# Patient Record
Sex: MALE
[De-identification: names, ages, dates, MRNs, and addresses within clinical notes are randomized per-mention and may not be internally consistent; named-entity substitution may affect disease eponyms.]

---

## 2021-12-17 ENCOUNTER — TRANSCRIPTION ENCOUNTER (OUTPATIENT)
Age: 61
End: 2021-12-17

## 2021-12-21 ENCOUNTER — TRANSCRIPTION ENCOUNTER (OUTPATIENT)
Age: 61
End: 2021-12-21

## 2022-06-24 ENCOUNTER — APPOINTMENT (OUTPATIENT)
Dept: ORTHOPEDIC SURGERY | Facility: CLINIC | Age: 62
End: 2022-06-24

## 2022-12-22 ENCOUNTER — APPOINTMENT (OUTPATIENT)
Dept: ORTHOPEDIC SURGERY | Facility: CLINIC | Age: 62
End: 2022-12-22

## 2022-12-22 DIAGNOSIS — Z78.9 OTHER SPECIFIED HEALTH STATUS: ICD-10-CM

## 2022-12-22 DIAGNOSIS — Z87.442 PERSONAL HISTORY OF URINARY CALCULI: ICD-10-CM

## 2022-12-22 PROCEDURE — 72040 X-RAY EXAM NECK SPINE 2-3 VW: CPT

## 2022-12-22 PROCEDURE — 99214 OFFICE O/P EST MOD 30 MIN: CPT

## 2022-12-22 RX ORDER — OMEPRAZOLE 20 MG/1
TABLET, DELAYED RELEASE ORAL
Refills: 0 | Status: ACTIVE | COMMUNITY

## 2022-12-22 RX ORDER — DERMATOPHAGOIDES PTERONYSSINUS AND DERMATOPHAGOIDES FARINAE 6; 6 [ARB'U]/1; [ARB'U]/1
TABLET SUBLINGUAL
Refills: 0 | Status: ACTIVE | COMMUNITY

## 2022-12-22 RX ORDER — FAMOTIDINE 10 MG/1
TABLET, FILM COATED ORAL
Refills: 0 | Status: ACTIVE | COMMUNITY

## 2022-12-22 RX ORDER — METHOCARBAMOL 750 MG/1
750 TABLET, FILM COATED ORAL
Qty: 60 | Refills: 0 | Status: ACTIVE | COMMUNITY
Start: 2022-12-22 | End: 1900-01-01

## 2022-12-22 RX ORDER — FLUTICASONE PROPIONATE 50 MCG
50 SPRAY, SUSPENSION NASAL
Refills: 0 | Status: ACTIVE | COMMUNITY

## 2022-12-22 RX ORDER — CETIRIZINE HCL 10 MG
TABLET ORAL
Refills: 0 | Status: ACTIVE | COMMUNITY

## 2022-12-23 NOTE — IMAGING
[Disc space narrowing] : Disc space narrowing [FreeTextEntry1] : Subsidence of C4-5 interbody, instrumentation stable C5-7

## 2022-12-23 NOTE — ASSESSMENT
[FreeTextEntry1] : Patient given RX for bone stimulator \par \par Patient given prescription for CT, follow up after study is completed to discuss results. \par \par Referral to neurology for neurological symptoms \par \par Patient will begin physical therapy. \par \par Recommend: - NSAID - Heating pad - Muscle relaxer - Neck stretching exercise - Soft cervical collar - Cervical traction Patient is given neck rehabilitation exercise book.

## 2022-12-23 NOTE — HISTORY OF PRESENT ILLNESS
[de-identified] : F/U Cervical spine \par Pt reports some increased pain and stiffness x~3 weeks with NKI. \par Pt had Xrays of neck at [facility] today- went for "throat issues" and also had a barium swallow. \par Pt reports pain and stiffness is worse at night- reports waking up at night with pain. \par States pain was radiating into Right arm- has improved recently. \par Denies numbness or tingling in UEs.\par Reports having intermittent PT sessions \par \par Reports "twitching" in eyes and frequent feelings of needing to urinate. \par

## 2022-12-26 ENCOUNTER — FORM ENCOUNTER (OUTPATIENT)
Age: 62
End: 2022-12-26

## 2022-12-31 ENCOUNTER — RESULT REVIEW (OUTPATIENT)
Age: 62
End: 2022-12-31

## 2023-01-05 ENCOUNTER — TRANSCRIPTION ENCOUNTER (OUTPATIENT)
Age: 63
End: 2023-01-05

## 2023-01-19 ENCOUNTER — APPOINTMENT (OUTPATIENT)
Dept: ORTHOPEDIC SURGERY | Facility: CLINIC | Age: 63
End: 2023-01-19
Payer: COMMERCIAL

## 2023-01-19 VITALS — WEIGHT: 205 LBS | HEIGHT: 72 IN | BODY MASS INDEX: 27.77 KG/M2

## 2023-01-19 DIAGNOSIS — M54.12 RADICULOPATHY, CERVICAL REGION: ICD-10-CM

## 2023-01-19 DIAGNOSIS — M50.320 OTHER CERVICAL DISC DEGENERATION, MID-CERVICAL REGION, UNSPECIFIED LEVEL: ICD-10-CM

## 2023-01-19 DIAGNOSIS — M47.22 OTHER SPONDYLOSIS WITH RADICULOPATHY, CERVICAL REGION: ICD-10-CM

## 2023-01-19 DIAGNOSIS — M48.02 SPINAL STENOSIS, CERVICAL REGION: ICD-10-CM

## 2023-01-19 PROCEDURE — 99214 OFFICE O/P EST MOD 30 MIN: CPT

## 2023-01-19 NOTE — ASSESSMENT
[FreeTextEntry1] : C4-5 discectomy and fusion with incomplete incorporation of interbody spacer into the adjacent endplates\par instrumentation stable C5-7, with solid fusion \par \par Patient given RX for bone stimulator \par \par Recommend: - NSAID - Heating pad - Muscle relaxer - Neck stretching exercise - Soft cervical collar - Cervical traction Patient is given neck rehabilitation exercise book. \par \par Follow up in 3 months

## 2023-01-19 NOTE — HISTORY OF PRESENT ILLNESS
[de-identified] : Follow up cervical spine CT Results at . PT office closed for 3 weeks for vacation. Taking Methocarbamol with some relief. \par \par

## 2023-01-19 NOTE — DATA REVIEWED
[CT Scan] : CT scan [Cervical Spine] : cervical spine [Report was reviewed and noted in the chart] : The report was reviewed and noted in the chart [I independently reviewed and interpreted images and report] : I independently reviewed and interpreted images and report [FreeTextEntry1] : C4-5 discectomy and fusion with incomplete incorporation of interbody spacer into the adjacent endplates\par instrumentation stable C5-7, with solid fusion

## 2023-02-15 RX ORDER — TIZANIDINE 2 MG/1
2 TABLET ORAL
Qty: 30 | Refills: 0 | Status: ACTIVE | COMMUNITY
Start: 2023-02-15 | End: 1900-01-01

## 2023-05-07 ENCOUNTER — FORM ENCOUNTER (OUTPATIENT)
Age: 63
End: 2023-05-07

## 2023-08-27 ENCOUNTER — NON-APPOINTMENT (OUTPATIENT)
Age: 63
End: 2023-08-27

## 2023-09-22 ENCOUNTER — APPOINTMENT (OUTPATIENT)
Dept: ORTHOPEDIC SURGERY | Facility: CLINIC | Age: 63
End: 2023-09-22

## 2023-11-10 ENCOUNTER — APPOINTMENT (OUTPATIENT)
Dept: ORTHOPEDIC SURGERY | Facility: CLINIC | Age: 63
End: 2023-11-10
Payer: COMMERCIAL

## 2023-11-10 DIAGNOSIS — S63.642A SPRAIN OF METACARPOPHALANGEAL JOINT OF LEFT THUMB, INITIAL ENCOUNTER: ICD-10-CM

## 2023-11-10 DIAGNOSIS — Z86.19 PERSONAL HISTORY OF OTHER INFECTIOUS AND PARASITIC DISEASES: ICD-10-CM

## 2023-11-10 PROCEDURE — 99213 OFFICE O/P EST LOW 20 MIN: CPT

## 2023-11-10 PROCEDURE — 73110 X-RAY EXAM OF WRIST: CPT | Mod: LT

## 2023-11-24 ENCOUNTER — RESULT REVIEW (OUTPATIENT)
Age: 63
End: 2023-11-24

## 2023-12-08 ENCOUNTER — APPOINTMENT (OUTPATIENT)
Dept: ORTHOPEDIC SURGERY | Facility: CLINIC | Age: 63
End: 2023-12-08
Payer: COMMERCIAL

## 2023-12-08 DIAGNOSIS — M18.12 UNILATERAL PRIMARY OSTEOARTHRITIS OF FIRST CARPOMETACARPAL JOINT, LEFT HAND: ICD-10-CM

## 2023-12-08 PROCEDURE — 99214 OFFICE O/P EST MOD 30 MIN: CPT | Mod: 25

## 2023-12-08 PROCEDURE — 20605 DRAIN/INJ JOINT/BURSA W/O US: CPT | Mod: LT

## 2024-02-09 ENCOUNTER — APPOINTMENT (OUTPATIENT)
Dept: ORTHOPEDIC SURGERY | Facility: CLINIC | Age: 64
End: 2024-02-09
Payer: COMMERCIAL

## 2024-02-09 DIAGNOSIS — M51.36 OTHER INTERVERTEBRAL DISC DEGENERATION, LUMBAR REGION: ICD-10-CM

## 2024-02-09 DIAGNOSIS — M48.061 SPINAL STENOSIS, LUMBAR REGION WITHOUT NEUROGENIC CLAUDICATION: ICD-10-CM

## 2024-02-09 DIAGNOSIS — M47.817 SPONDYLOSIS W/OUT MYELOPATHY OR RADICULOPATHY, LUMBOSACRAL REGION: ICD-10-CM

## 2024-02-09 DIAGNOSIS — M54.16 RADICULOPATHY, LUMBAR REGION: ICD-10-CM

## 2024-02-09 DIAGNOSIS — M51.37 OTHER INTERVERTEBRAL DISC DEGENERATION, LUMBOSACRAL REGION: ICD-10-CM

## 2024-02-09 PROCEDURE — 99214 OFFICE O/P EST MOD 30 MIN: CPT

## 2024-02-09 NOTE — DATA REVIEWED
[MRI] : MRI [Lumbar Spine] : lumbar spine [I independently reviewed and interpreted images and report] : I independently reviewed and interpreted images and report [FreeTextEntry1] : 1/2024 MRI of L-Spine was reviewed today and is as follows:  L>R foraminal stenosis L3-4 secondary to bilateral facet arthropathy Bilateral foraminal stenosis L4-5

## 2024-02-11 NOTE — HISTORY OF PRESENT ILLNESS
[8] : 8 [3] : 3 [Intermittent] : intermittent [Rest] : rest [Heat] : heat [Lower back] : lower back [Gradual] : gradual [4] : 4 [5] : 5 [Sharp] : sharp [Constant] : constant [Sitting] : sitting [Exercising] : exercising [FreeTextEntry7] : down the right leg [FreeTextEntry9] : stretching [de-identified] :  lumbar spine MRI at  [de-identified] : Follow up left hand/thumb. Patient states he felt relief from left thumb CSI on 12/8/23.  Patient admits to taking Advil PRN for pain.  Today's Pain 0/10 on left hand/thumb  Today, reports back pain with activities.  [de-identified] : driving  [] : no

## 2024-02-11 NOTE — PHYSICAL EXAM
[Left] : left hand [de-identified] : Constitutional: Well groomed and developed.  Respiratory: Normal, unlabored breathing. No use of accessory muscles.  Skin: No rashes or ulcers. Skin warm and dry.  Psychiatric: Oriented to time, place, person and event. No acute distress. Gait: Heel to toe Patient able to walk on toes and heels.    Lumbar spine:  Posture: Normal on coronal and sagittal alignment  AROM:  Flexion 60  Extension 10  Moderate pain with simulated truncal motion   Tenderness:  Thoracic: No tenderness on palpation  Lumbar: Moderate tenderness on palpation  Sacrum/coccyx: no tenderness on palpation  Greater trochanteric bursa:  No tenderness  PSIS: None    Motor:                         R             L LE:                                IS                    5             5 Quad              5             5 TA                  5             5 EHL                5             5 Gastroc         5             5                 R  L DTR: Patella  2+  2+ Achilles  2+  2+  Sensory: Light touch sensation intact T12-S1  Babinski's Sign: Negative bilaterally  Straight leg raise test: Negative bilaterally  ZOILA test: negative bilaterally [de-identified] : TTP over ulnar collateral ligament of left thumb [] : no erythema

## 2024-02-11 NOTE — ASSESSMENT
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 MRI detailed above. Patient's pain is mostly over the CMC of the thumb at this time. Discussed that patient would most likely benefit from an injection in the affected joint today.   - Patient given CSI into 1st CMC joint today secondary to pain and inflammation. Patient tolerated the procedure well. Advised patient to ice the area well for the next 24 hours.   - Recommend NSAIDs PRN - Recommend rest, ice, compression, elevation - Recommend activity modification, avoid strenuous or high impact activities  Patient was educated on their diagnosis today. Emphasized the importance of gentle stretching and strengthening. Patient expressed understanding and all questions were answered today.  Follow up in 2 months

## 2024-02-11 NOTE — PHYSICAL EXAM
[Left] : left hand [de-identified] : Constitutional: Well groomed and developed.  Respiratory: Normal, unlabored breathing. No use of accessory muscles.  Skin: No rashes or ulcers. Skin warm and dry.  Psychiatric: Oriented to time, place, person and event. No acute distress. Gait: Heel to toe Patient able to walk on toes and heels.    Lumbar spine:  Posture: Normal on coronal and sagittal alignment  AROM:  Flexion 60  Extension 10  Moderate pain with simulated truncal motion   Tenderness:  Thoracic: No tenderness on palpation  Lumbar: Moderate tenderness on palpation  Sacrum/coccyx: no tenderness on palpation  Greater trochanteric bursa:  No tenderness  PSIS: None    Motor:                         R             L LE:                                IS                    5             5 Quad              5             5 TA                  5             5 EHL                5             5 Gastroc         5             5                 R  L DTR: Patella  2+  2+ Achilles  2+  2+  Sensory: Light touch sensation intact T12-S1  Babinski's Sign: Negative bilaterally  Straight leg raise test: Negative bilaterally  ZOILA test: negative bilaterally [] : no erythema [de-identified] : TTP over ulnar collateral ligament of left thumb

## 2024-02-11 NOTE — ASSESSMENT
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 MRI detailed above. Patient's pain is mostly over the CMC of the thumb at this time. Discussed that patient would most likely benefit from an injection in the affected joint today.   - Patient given CSI into 1st CMC joint today secondary to pain and inflammation. Patient tolerated the procedure well. Advised patient to ice the area well for the next 24 hours.   - Recommend NSAIDs PRN - Recommend rest, ice, compression, elevation - Recommend activity modification, avoid strenuous or high impact activities  Patient was educated on their diagnosis today. Emphasized the importance of gentle stretching and strengthening. Patient expressed understanding and all questions were answered today.  Follow up in 2 months

## 2024-02-11 NOTE — HISTORY OF PRESENT ILLNESS
[8] : 8 [3] : 3 [Intermittent] : intermittent [Rest] : rest [Heat] : heat [Gradual] : gradual [Lower back] : lower back [4] : 4 [5] : 5 [Sharp] : sharp [Sitting] : sitting [Constant] : constant [Exercising] : exercising [FreeTextEntry7] : down the right leg [FreeTextEntry9] : stretching [de-identified] :  lumbar spine MRI at  [de-identified] : Follow up left hand/thumb. Patient states he felt relief from left thumb CSI on 12/8/23.  Patient admits to taking Advil PRN for pain.  Today's Pain 0/10 on left hand/thumb  Today, reports back pain with activities.  [] : no [de-identified] : driving

## 2024-06-14 ENCOUNTER — APPOINTMENT (OUTPATIENT)
Dept: ORTHOPEDIC SURGERY | Facility: CLINIC | Age: 64
End: 2024-06-14
Payer: COMMERCIAL

## 2024-06-14 ENCOUNTER — APPOINTMENT (OUTPATIENT)
Dept: CARDIOLOGY | Facility: CLINIC | Age: 64
End: 2024-06-14

## 2024-06-14 DIAGNOSIS — G95.9 DISEASE OF SPINAL CORD, UNSPECIFIED: ICD-10-CM

## 2024-06-14 DIAGNOSIS — S12.9XXA FRACTURE OF NECK, UNSPECIFIED, INITIAL ENCOUNTER: ICD-10-CM

## 2024-06-14 DIAGNOSIS — Z98.1 ARTHRODESIS STATUS: ICD-10-CM

## 2024-06-14 PROCEDURE — 99214 OFFICE O/P EST MOD 30 MIN: CPT

## 2024-06-14 PROCEDURE — 72040 X-RAY EXAM NECK SPINE 2-3 VW: CPT

## 2024-06-14 NOTE — IMAGING
[Straightening consistent with spasm] : Straightening consistent with spasm [FreeTextEntry1] : Fusion hardware s/p ACDF C4-5, 5-6, 6-7, with lucency surrounding the cage at C4-5

## 2024-06-14 NOTE — HISTORY OF PRESENT ILLNESS
[de-identified] : Follow up cervical spine. Patient states his pain radiates down the back and right arm, has numbness/tingling. Patient states he has difficulty grasping objects with his right hand. Patient states he has numbness into his right pinky. Patient admits to taking Advil for pain with some relief.   Today's Pain 7/10

## 2024-06-14 NOTE — ASSESSMENT
[FreeTextEntry1] : 62 yo male presents today for eval of his neck pain, S/P ACDF C4-7. XR today is suggestive of lucency surrounding the C4-5 cage. Given patient's persistent right sided radiculopathy, pain, and limits to ROM, an MRI with contrast is necessary to further evaluate for nerve involvement and extent of lucency suggested on XR today.  - Patient given prescription for MRI, follow up after study is completed to discuss results.  - Patient given prescription for EMG/NCS, follow up after study is completed to discuss results.   - Recommend NSAIDs PRN - Recommend heating pad use to decrease muscle spasm - Discussed the importance of home exercises, including but not limited to neck stretching and cervical traction   Patient was educated on their diagnosis today. All questions answered and patient expressed understanding.   F/U after MRI

## 2024-06-21 ENCOUNTER — APPOINTMENT (OUTPATIENT)
Dept: CARDIOLOGY | Facility: CLINIC | Age: 64
End: 2024-06-21
Payer: COMMERCIAL

## 2024-06-21 ENCOUNTER — NON-APPOINTMENT (OUTPATIENT)
Age: 64
End: 2024-06-21

## 2024-06-21 VITALS
TEMPERATURE: 98 F | SYSTOLIC BLOOD PRESSURE: 120 MMHG | HEART RATE: 78 BPM | OXYGEN SATURATION: 97 % | WEIGHT: 226 LBS | HEIGHT: 72 IN | DIASTOLIC BLOOD PRESSURE: 72 MMHG | BODY MASS INDEX: 30.61 KG/M2

## 2024-06-21 VITALS — DIASTOLIC BLOOD PRESSURE: 80 MMHG | SYSTOLIC BLOOD PRESSURE: 124 MMHG

## 2024-06-21 DIAGNOSIS — R53.83 OTHER FATIGUE: ICD-10-CM

## 2024-06-21 DIAGNOSIS — Z83.3 FAMILY HISTORY OF DIABETES MELLITUS: ICD-10-CM

## 2024-06-21 DIAGNOSIS — Z82.49 FAMILY HISTORY OF ISCHEMIC HEART DISEASE AND OTHER DISEASES OF THE CIRCULATORY SYSTEM: ICD-10-CM

## 2024-06-21 DIAGNOSIS — R07.89 OTHER CHEST PAIN: ICD-10-CM

## 2024-06-21 DIAGNOSIS — Z78.9 OTHER SPECIFIED HEALTH STATUS: ICD-10-CM

## 2024-06-21 DIAGNOSIS — Z98.890 OTHER SPECIFIED POSTPROCEDURAL STATES: ICD-10-CM

## 2024-06-21 PROCEDURE — 93000 ELECTROCARDIOGRAM COMPLETE: CPT

## 2024-06-21 PROCEDURE — G2211 COMPLEX E/M VISIT ADD ON: CPT | Mod: NC

## 2024-06-21 PROCEDURE — 99204 OFFICE O/P NEW MOD 45 MIN: CPT | Mod: 25

## 2024-06-21 NOTE — REASON FOR VISIT
[FreeTextEntry1] : 63 year old male with PMHx: spinal fusion, nephrolithiasis, hx of lyme disease who presents for cardiovascular evaluation for chest tightness and fatigue over past few months.

## 2024-06-21 NOTE — HISTORY OF PRESENT ILLNESS
[FreeTextEntry1] : 63 year old male with PMHx: spinal fusion, nephrolithiasis, hx of lyme disease who presents for cardiovascular evaluation for chest tightness and fatigue over past few months.  PCP: Sanjida Cabot, MD, PhD   Avid runner decreased functional capacity had neck fusion and moved trachea around has chest tightness with exertion radiating across chest no prior cardiac workup. ongoing fatigue which is progressive over past few months. diagonsed with lyme disease in january, taking amoxicillin for it worsening allergies corresponding with symptoms on inhalers without relief of symptoms. healthy nutrition very healthy lifestyle, limited high risk behaviors.

## 2024-06-21 NOTE — DISCUSSION/SUMMARY
[FreeTextEntry1] : Patient presents for: shortness of breath, chest tightness     +decline functional capacity 4 months lyme disease active chest tightness shortness of breath     Orders placed including imaging/meds:  - obtain records from PCP: Sanjida Cabot, MD, PhD  - CCTA r/o CAD - Holter monitor 3 days to r/o conduction disease, heart block given lyme disease which is actively being treated. - 2d TTE cardiac structure and function   Patient warm and euvolemic. There is no evidence of active ACS, decompensated HF, uncontrolled arrhythmias or significant valvular heart disease at present. EKG is non-ischemic. We went over the EKG in office today, all questions answered. Vitals reviewed.     I've asked the patient to keep a blood pressure journal. The patient should take BP in both arms twice daily and keep a log for the next 1 week. I've asked the patient to send a copy of the journal to the office or their PCP via portal so necessary medication adjustments can be made.     The patient is aware of alarm cardiac type symptoms, will call with questions or concerns and is aware to activate 911 and/or present to the closest emergency department if concerns.  Follow up: with me in 3 months or sooner as requested.     I strongly encouraged the patient to pursue the following preventative cardiology, lifestyle modifications which are necessary for long-term cardiovascular health. The following is recommended: Advised a mediterranean and/or plant predominant, high fiber, limited salt (ideal <2 g/day), low fat diet, stress reduction/psychosomatic management, sleep hygiene/ROSAS testing and healthy weight loss, annual influenza/preventive vaccines, medication + treatment adherence/compliance, high risk behavior mitigation (I.e. tobacco abstinence, alcohol abstinence, no binge drinking, recreational/illicit drug use abstinence), increased exercise activity aiming for 150 minutes per week of moderate physical activity as per AHA/ACC standard for long term cardiovascular risk reduction. [EKG obtained to assist in diagnosis and management of assessed problem(s)] : EKG obtained to assist in diagnosis and management of assessed problem(s)

## 2024-06-28 ENCOUNTER — APPOINTMENT (OUTPATIENT)
Dept: CARDIOLOGY | Facility: CLINIC | Age: 64
End: 2024-06-28
Payer: COMMERCIAL

## 2024-06-28 PROCEDURE — 93306 TTE W/DOPPLER COMPLETE: CPT

## 2024-06-29 ENCOUNTER — RESULT REVIEW (OUTPATIENT)
Age: 64
End: 2024-06-29

## 2024-07-02 ENCOUNTER — APPOINTMENT (OUTPATIENT)
Dept: NEUROLOGY | Facility: CLINIC | Age: 64
End: 2024-07-02
Payer: COMMERCIAL

## 2024-07-02 PROCEDURE — 95886 MUSC TEST DONE W/N TEST COMP: CPT

## 2024-07-02 PROCEDURE — 95910 NRV CNDJ TEST 7-8 STUDIES: CPT

## 2024-07-17 ENCOUNTER — NON-APPOINTMENT (OUTPATIENT)
Age: 64
End: 2024-07-17

## 2024-07-17 DIAGNOSIS — Z80.9 FAMILY HISTORY OF MALIGNANT NEOPLASM, UNSPECIFIED: ICD-10-CM

## 2024-07-17 DIAGNOSIS — Z87.39 PERSONAL HISTORY OF OTHER DISEASES OF THE MUSCULOSKELETAL SYSTEM AND CONNECTIVE TISSUE: ICD-10-CM

## 2024-07-17 DIAGNOSIS — B35.1 TINEA UNGUIUM: ICD-10-CM

## 2024-07-17 DIAGNOSIS — Z82.61 FAMILY HISTORY OF ARTHRITIS: ICD-10-CM

## 2024-07-17 DIAGNOSIS — Z86.19 PERSONAL HISTORY OF OTHER INFECTIOUS AND PARASITIC DISEASES: ICD-10-CM

## 2024-09-04 ENCOUNTER — APPOINTMENT (OUTPATIENT)
Dept: ORTHOPEDIC SURGERY | Facility: CLINIC | Age: 64
End: 2024-09-04
Payer: COMMERCIAL

## 2024-09-04 DIAGNOSIS — S12.9XXA FRACTURE OF NECK, UNSPECIFIED, INITIAL ENCOUNTER: ICD-10-CM

## 2024-09-04 DIAGNOSIS — M48.02 SPINAL STENOSIS, CERVICAL REGION: ICD-10-CM

## 2024-09-04 DIAGNOSIS — M47.22 OTHER SPONDYLOSIS WITH RADICULOPATHY, CERVICAL REGION: ICD-10-CM

## 2024-09-04 DIAGNOSIS — M54.12 RADICULOPATHY, CERVICAL REGION: ICD-10-CM

## 2024-09-04 DIAGNOSIS — M50.320 OTHER CERVICAL DISC DEGENERATION, MID-CERVICAL REGION, UNSPECIFIED LEVEL: ICD-10-CM

## 2024-09-04 DIAGNOSIS — Z98.1 ARTHRODESIS STATUS: ICD-10-CM

## 2024-09-04 DIAGNOSIS — G95.9 DISEASE OF SPINAL CORD, UNSPECIFIED: ICD-10-CM

## 2024-09-04 PROCEDURE — 99214 OFFICE O/P EST MOD 30 MIN: CPT

## 2024-09-04 NOTE — HISTORY OF PRESENT ILLNESS
[de-identified] : Body part: neck Better/ Worse/ Same since last visit: slightly better Treatments since last visit: cervical spine MRI at  and EMG Difficulty with: grasping objects Radicular symptoms: down the back and right arm Paresthesia: yes Current medications for pain: Advil Assistive walking device: none   Today's Pain: 5/10

## 2024-09-04 NOTE — ASSESSMENT
[FreeTextEntry1] : EMG B/L UE: Chronic right C5, C6 radiculopathies Acute on chronic right C7, C8 radiculopathies  6/2024 MRI of C-Spine was reviewed today and is as follows:  ACDF C4-7 without residual stenosis  Mild stenosis C3-4  64 yo male presents today for eval of his neck pain, S/P ACDF C4-7. MRI detailed above shows mild stenosis but EMG suggestive of acute nerve inflammation. I discussed with patient he may benefit from KARIN given findings and consistent right cervical radicular symptoms.   - Referral to pain management for cervical KARIN, follow up 2 weeks after injection.   - Continue physical therapy to regain range of motion, strengthening and symptomatic improvement. Prescription given in office today.   - Recommend NSAIDs PRN - Recommend heating pad use to decrease muscle spasm - Discussed the importance of home exercises, including but not limited to neck stretching and cervical traction   Patient was educated on their diagnosis today. All questions answered and patient expressed understanding.   Follow up in 2 months

## 2024-09-04 NOTE — DATA REVIEWED
[EMG Nerve Conduction] : A EMG Nerve Conduction test was completed of the [Bilateral] : bilateral [Upper extremity] : upper extremity [Positive] : positive [MRI] : MRI [Cervical Spine] : cervical spine [I independently reviewed and interpreted images and report] : I independently reviewed and interpreted images and report [FreeTextEntry1] : EMG B/L UE: Chronic right C5, C6 radiculopathies Acute on chronic right C7, C8 radiculopathies  6/2024 MRI of C-Spine was reviewed today and is as follows:  ACDF C4-7 without residual stenosis  Mild stenosis C3-4

## 2024-11-07 ENCOUNTER — APPOINTMENT (OUTPATIENT)
Dept: PAIN MANAGEMENT | Facility: CLINIC | Age: 64
End: 2024-11-07

## 2024-12-16 ENCOUNTER — APPOINTMENT (OUTPATIENT)
Dept: PAIN MANAGEMENT | Facility: CLINIC | Age: 64
End: 2024-12-16
Payer: COMMERCIAL

## 2024-12-16 VITALS — WEIGHT: 212 LBS | HEIGHT: 72 IN | BODY MASS INDEX: 28.71 KG/M2

## 2024-12-16 DIAGNOSIS — M54.12 RADICULOPATHY, CERVICAL REGION: ICD-10-CM

## 2024-12-16 DIAGNOSIS — Z98.1 ARTHRODESIS STATUS: ICD-10-CM

## 2024-12-16 DIAGNOSIS — M47.22 OTHER SPONDYLOSIS WITH RADICULOPATHY, CERVICAL REGION: ICD-10-CM

## 2024-12-16 PROCEDURE — 99204 OFFICE O/P NEW MOD 45 MIN: CPT

## 2025-01-17 ENCOUNTER — APPOINTMENT (OUTPATIENT)
Dept: PAIN MANAGEMENT | Facility: CLINIC | Age: 65
End: 2025-01-17
Payer: COMMERCIAL

## 2025-01-17 DIAGNOSIS — M54.12 RADICULOPATHY, CERVICAL REGION: ICD-10-CM

## 2025-01-17 PROCEDURE — 62321 NJX INTERLAMINAR CRV/THRC: CPT

## 2025-02-03 ENCOUNTER — APPOINTMENT (OUTPATIENT)
Dept: PAIN MANAGEMENT | Facility: CLINIC | Age: 65
End: 2025-02-03
Payer: COMMERCIAL

## 2025-02-03 VITALS — BODY MASS INDEX: 31.15 KG/M2 | WEIGHT: 230 LBS | HEIGHT: 72 IN

## 2025-02-03 PROCEDURE — 99214 OFFICE O/P EST MOD 30 MIN: CPT

## 2025-02-04 ENCOUNTER — APPOINTMENT (OUTPATIENT)
Dept: PAIN MANAGEMENT | Facility: CLINIC | Age: 65
End: 2025-02-04

## 2025-02-05 ENCOUNTER — APPOINTMENT (OUTPATIENT)
Dept: ORTHOPEDIC SURGERY | Facility: CLINIC | Age: 65
End: 2025-02-05
Payer: COMMERCIAL

## 2025-02-05 DIAGNOSIS — S12.9XXA FRACTURE OF NECK, UNSPECIFIED, INITIAL ENCOUNTER: ICD-10-CM

## 2025-02-05 DIAGNOSIS — Z98.1 ARTHRODESIS STATUS: ICD-10-CM

## 2025-02-05 DIAGNOSIS — M47.22 OTHER SPONDYLOSIS WITH RADICULOPATHY, CERVICAL REGION: ICD-10-CM

## 2025-02-05 DIAGNOSIS — M48.02 SPINAL STENOSIS, CERVICAL REGION: ICD-10-CM

## 2025-02-05 DIAGNOSIS — G95.9 DISEASE OF SPINAL CORD, UNSPECIFIED: ICD-10-CM

## 2025-02-05 DIAGNOSIS — M54.12 RADICULOPATHY, CERVICAL REGION: ICD-10-CM

## 2025-02-05 DIAGNOSIS — M50.320 OTHER CERVICAL DISC DEGENERATION, MID-CERVICAL REGION, UNSPECIFIED LEVEL: ICD-10-CM

## 2025-02-05 PROCEDURE — 99214 OFFICE O/P EST MOD 30 MIN: CPT

## 2025-02-17 ENCOUNTER — APPOINTMENT (OUTPATIENT)
Dept: ORTHOPEDIC SURGERY | Facility: CLINIC | Age: 65
End: 2025-02-17
Payer: COMMERCIAL

## 2025-02-17 VITALS — BODY MASS INDEX: 31.15 KG/M2 | HEIGHT: 72 IN | WEIGHT: 230 LBS

## 2025-02-17 DIAGNOSIS — M18.12 UNILATERAL PRIMARY OSTEOARTHRITIS OF FIRST CARPOMETACARPAL JOINT, LEFT HAND: ICD-10-CM

## 2025-02-17 PROCEDURE — 73110 X-RAY EXAM OF WRIST: CPT | Mod: LT

## 2025-02-17 PROCEDURE — 99203 OFFICE O/P NEW LOW 30 MIN: CPT

## 2025-04-21 ENCOUNTER — APPOINTMENT (OUTPATIENT)
Dept: ORTHOPEDIC SURGERY | Facility: CLINIC | Age: 65
End: 2025-04-21

## 2025-04-21 DIAGNOSIS — M18.12 UNILATERAL PRIMARY OSTEOARTHRITIS OF FIRST CARPOMETACARPAL JOINT, LEFT HAND: ICD-10-CM

## 2025-04-21 PROCEDURE — 20605 DRAIN/INJ JOINT/BURSA W/O US: CPT | Mod: LT

## 2025-04-21 PROCEDURE — 99213 OFFICE O/P EST LOW 20 MIN: CPT | Mod: 25

## 2025-05-05 ENCOUNTER — APPOINTMENT (OUTPATIENT)
Dept: PAIN MANAGEMENT | Facility: CLINIC | Age: 65
End: 2025-05-05

## 2025-05-05 NOTE — REASON FOR VISIT
Rx Refill Note  Requested Prescriptions     Pending Prescriptions Disp Refills    DULoxetine (CYMBALTA) 60 MG capsule [Pharmacy Med Name: DULoxetine HCL DR 60 MG CAPSULE] 60 capsule 0     Sig: TAKE 1 CAPSULE BY MOUTH 2 TIMES A DAY      Last office visit with prescribing clinician: 2/27/2024   Last telemedicine visit with prescribing clinician: Visit date not found   Next office visit with prescribing clinician: Visit date not found.       Shanon Mauricio MA  05/05/25, 09:48 EDT   [FreeTextEntry2] : Neck pain ongoing for years\par  s/p ACDF 3/2020

## 2025-05-23 ENCOUNTER — APPOINTMENT (OUTPATIENT)
Dept: ORTHOPEDIC SURGERY | Facility: CLINIC | Age: 65
End: 2025-05-23

## 2025-06-13 ENCOUNTER — APPOINTMENT (OUTPATIENT)
Dept: ORTHOPEDIC SURGERY | Facility: CLINIC | Age: 65
End: 2025-06-13
Payer: COMMERCIAL

## 2025-06-13 PROBLEM — M54.16 RIGHT LUMBAR RADICULOPATHY: Status: RESOLVED | Noted: 2024-02-09 | Resolved: 2025-06-13

## 2025-06-13 PROBLEM — M51.379 DEGENERATION OF INTERVERTEBRAL DISC OF LUMBOSACRAL REGION: Status: ACTIVE | Noted: 2024-02-09

## 2025-06-13 PROBLEM — M51.369 DDD (DEGENERATIVE DISC DISEASE), LUMBAR: Status: ACTIVE | Noted: 2024-02-09

## 2025-06-13 PROCEDURE — 72100 X-RAY EXAM L-S SPINE 2/3 VWS: CPT

## 2025-06-13 PROCEDURE — 99214 OFFICE O/P EST MOD 30 MIN: CPT

## 2025-07-18 ENCOUNTER — APPOINTMENT (OUTPATIENT)
Dept: ORTHOPEDIC SURGERY | Facility: CLINIC | Age: 65
End: 2025-07-18

## 2025-08-27 ENCOUNTER — NON-APPOINTMENT (OUTPATIENT)
Age: 65
End: 2025-08-27